# Patient Record
Sex: MALE | Race: BLACK OR AFRICAN AMERICAN | NOT HISPANIC OR LATINO | Employment: FULL TIME | URBAN - METROPOLITAN AREA
[De-identification: names, ages, dates, MRNs, and addresses within clinical notes are randomized per-mention and may not be internally consistent; named-entity substitution may affect disease eponyms.]

---

## 2022-05-29 ENCOUNTER — HOSPITAL ENCOUNTER (EMERGENCY)
Facility: MEDICAL CENTER | Age: 32
End: 2022-05-29
Attending: EMERGENCY MEDICINE

## 2022-05-29 VITALS
DIASTOLIC BLOOD PRESSURE: 72 MMHG | TEMPERATURE: 98.6 F | WEIGHT: 219.8 LBS | BODY MASS INDEX: 30.77 KG/M2 | OXYGEN SATURATION: 97 % | SYSTOLIC BLOOD PRESSURE: 114 MMHG | HEART RATE: 88 BPM | RESPIRATION RATE: 20 BRPM | HEIGHT: 71 IN

## 2022-05-29 DIAGNOSIS — L60.0 INGROWN TOENAIL: ICD-10-CM

## 2022-05-29 DIAGNOSIS — L03.031 CELLULITIS OF GREAT TOE OF RIGHT FOOT: ICD-10-CM

## 2022-05-29 PROCEDURE — 700102 HCHG RX REV CODE 250 W/ 637 OVERRIDE(OP): Performed by: EMERGENCY MEDICINE

## 2022-05-29 PROCEDURE — A9270 NON-COVERED ITEM OR SERVICE: HCPCS | Performed by: EMERGENCY MEDICINE

## 2022-05-29 PROCEDURE — 99283 EMERGENCY DEPT VISIT LOW MDM: CPT | Mod: EDC

## 2022-05-29 RX ORDER — CEPHALEXIN 500 MG/1
500 CAPSULE ORAL 4 TIMES DAILY
Qty: 28 CAPSULE | Refills: 0 | Status: SHIPPED | OUTPATIENT
Start: 2022-05-29 | End: 2022-06-05

## 2022-05-29 RX ORDER — NAPROXEN 500 MG/1
500 TABLET ORAL 2 TIMES DAILY PRN
Qty: 20 TABLET | Refills: 0 | Status: SHIPPED | OUTPATIENT
Start: 2022-05-29

## 2022-05-29 RX ORDER — NAPROXEN 500 MG/1
500 TABLET ORAL ONCE
Status: COMPLETED | OUTPATIENT
Start: 2022-05-29 | End: 2022-05-29

## 2022-05-29 RX ADMIN — Medication 500 MG: at 10:19

## 2022-05-29 NOTE — ED PROVIDER NOTES
"CHIEF COMPLAINT  Chief Complaint   Patient presents with   • Toe Pain       HPI  Loki Saunders is a 31 y.o. male who presents with 2 weeks of toe pain on the right great toe.  He states that has been draining pus and blood.  No fevers.  No swelling in the foot.  Nothing makes it better or worse.  No injury.  He feels like maybe he has been wearing shoes that have been too tight.    REVIEW OF SYSTEMS  No fevers, no paresthesias or weakness    PAST MEDICAL HISTORY  History reviewed. No pertinent past medical history.    FAMILY HISTORY  History reviewed. No pertinent family history.    SOCIAL HISTORY  Social History     Tobacco Use   • Smoking status: Current Every Day Smoker     Packs/day: 0.25     Types: Cigarettes   Substance and Sexual Activity   • Alcohol use: Never   • Drug use: Never       SURGICAL HISTORY  History reviewed. No pertinent surgical history.    CURRENT MEDICATIONS  Home Medications     Reviewed by Kamar Orlando R.N. (Registered Nurse) on 05/29/22 at 0917  Med List Status: Not Addressed   Medication Last Dose Status        Patient Brandon Taking any Medications                       ALLERGIES  No Known Allergies    PHYSICAL EXAM  VITAL SIGNS: /83   Pulse 88   Temp 36.6 °C (97.8 °F) (Temporal)   Resp 16   Ht 1.803 m (5' 11\")   Wt 99.7 kg (219 lb 12.8 oz)   SpO2 98%   BMI 30.66 kg/m²      Constitutional: Well developed, Well nourished, No acute distress, Non-toxic appearance.   HENT: Normocephalic, Atraumatic  Cardiovascular: Regular pulse  Lungs: No respiratory distress  Skin: Warm, Dry, no rash  Extremities: On the right great toe there is dried blood in some yellowish material medially-there may be a slight ingrown toenail, no proximal toe swelling, no bony tenderness, DP PT pulses 2+, dorsiflexion plantarflexion intact, mild erythema to the medial aspect of the great toe  Neurologic: Alert, appropriate, follows commands  Psychiatric: Affect normal    COURSE & MEDICAL DECISION " MAKING  Pertinent Labs & Imaging studies reviewed. (See chart for details)  This is a 31-year-old male who presents with a small paronychia infection and mild ingrown toenail.  I had a discussion in regards to removing part of the toenail and at this point he prefers not to do that.  He will be treated with antibiotics and warm soaks.  He will be advised to wear open toed footwear and otherwise follow-up with eye doctor in the next couple days for recheck.    FINAL IMPRESSION  1. Ingrown toenail  2. cellulitis  3.         Electronically signed by: Jones Alexandra M.D., 5/29/2022 9:45 AM

## 2022-05-29 NOTE — ED NOTES
Discharge instructions including the importance of hydration, the use of OTC medications, information on 1. Ingrown toenail      2. Cellulitis of great toe of right foot     and the proper follow up recommendations have been provided. Verbalizes understanding.  Confirms all questions have been answered.  A copy of the discharge instructions have been provided.  A signed copy is in the chart.  All pertinent medications reviewed. Ambulated out of department at time of discharge.

## 2022-07-21 ENCOUNTER — APPOINTMENT (OUTPATIENT)
Dept: RADIOLOGY | Facility: MEDICAL CENTER | Age: 32
End: 2022-07-21
Attending: EMERGENCY MEDICINE

## 2022-07-21 ENCOUNTER — HOSPITAL ENCOUNTER (EMERGENCY)
Facility: MEDICAL CENTER | Age: 32
End: 2022-07-21
Attending: EMERGENCY MEDICINE

## 2022-07-21 VITALS
TEMPERATURE: 97.4 F | BODY MASS INDEX: 30.1 KG/M2 | HEART RATE: 97 BPM | DIASTOLIC BLOOD PRESSURE: 84 MMHG | RESPIRATION RATE: 17 BRPM | SYSTOLIC BLOOD PRESSURE: 138 MMHG | WEIGHT: 215 LBS | OXYGEN SATURATION: 98 % | HEIGHT: 71 IN

## 2022-07-21 DIAGNOSIS — R04.2 HEMOPTYSIS: ICD-10-CM

## 2022-07-21 PROCEDURE — 99283 EMERGENCY DEPT VISIT LOW MDM: CPT

## 2022-07-21 PROCEDURE — 71046 X-RAY EXAM CHEST 2 VIEWS: CPT

## 2022-07-21 NOTE — Clinical Note
Loki Saunders was seen and treated in our emergency department on 7/21/2022.  He may return to work on 07/22/2022.       If you have any questions or concerns, please don't hesitate to call.      Serge De Jesus M.D.

## 2022-07-22 NOTE — ED TRIAGE NOTES
Loki GREER Bill  31 y.o. male  Chief Complaint   Patient presents with   • Blood in Sputum     States has intermittent episodes of red/dark sputum. States happened once on Sunday and once today. Will clear up then come back.      Pt ambulatory to triage for above. Denies any other complaints.     Denies pmhx. Pt in no acute distress.     Triage process explained to patient, returned to lobby. Pt encouraged to notify staff of any change in condition.
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
